# Patient Record
(demographics unavailable — no encounter records)

---

## 2017-10-19 NOTE — MAMMOGRAPHY REPORT
UNILATERAL LEFT DIGITAL DIAGNOSTIC MAMMOGRAM TOMOSYNTHESIS WITH CAD AND TARGETED LEFT ULTRASOUND: 10/
18/2017

CLINICAL HISTORY: The patient reports intermittent pain in her left lateral breast for approximately 
2 months.  She denies any associated palpable lumps or other complaints.  





TECHNIQUE:  Breast tomosynthesis in addition to standard 2D mammography was performed. Current study 
was also evaluated with a Computer Aided Detection (CAD) system.  Left CC and MLO 2-D and tomosynthes
is images were obtained.



COMPARISON: No prior exams were available for comparison.   



BREAST COMPOSITION:  There are scattered areas of fibroglandular density in the left breast.  



FINDINGS: 

There are no suspicious masses, calcifications, or areas of architectural distortion noted in the lef
t breast mammographically.



Targeted ultrasound was performed of the area of pain pointed out by the patient, in the left lateral
 breast at approximately 2 to 3:00.  Sonographically normal tissue is seen in this region, without ev
idence of a mass or other suspicious sonographic abnormality.





IMPRESSION:  ACR BI-RADS CATEGORY 1: NEGATIVE, TARGETED ULTRASOUND ACR BI-RADS CATEGORY 1: NEGATIVE 

No suspicious mammographic or sonographic abnormality to explain left lateral breast pain.  There is 
no mammographic or targeted sonographic evidence of malignancy.  Recommend clinical follow-up for lef
t breast pain.  



The patient has been verbally notified of the results.  





Approximately 10% of breast cancers are not detected with mammography. A negative mammographic report
 should not delay biopsy if a clinically suggestive mass is present.



Brittany Coelho M.D.          

ah/:10/18/2017 14:27:30  



Imaging Technologist: Ana Maria AQUINO)(SISI), [facility]

letter sent: Normal 1/2  

BI-RADS Code: ACR BI-RADS Category 1: Negative  Ultrasound BI-RADS: ACR BI-RADS Category 1: Negative